# Patient Record
Sex: FEMALE | URBAN - METROPOLITAN AREA
[De-identification: names, ages, dates, MRNs, and addresses within clinical notes are randomized per-mention and may not be internally consistent; named-entity substitution may affect disease eponyms.]

---

## 2017-05-23 ENCOUNTER — IMPORTED ENCOUNTER (OUTPATIENT)
Dept: URBAN - METROPOLITAN AREA CLINIC 1 | Facility: CLINIC | Age: 62
End: 2017-05-23

## 2017-05-23 PROBLEM — H00.015: Noted: 2017-05-23

## 2017-05-23 PROBLEM — H01.005: Noted: 2017-05-23

## 2017-05-23 PROBLEM — H00.012: Noted: 2017-05-23

## 2017-05-23 PROBLEM — H25.13: Noted: 2017-05-23

## 2017-05-23 PROBLEM — H04.123: Noted: 2017-05-23

## 2017-05-23 PROBLEM — H01.002: Noted: 2017-05-23

## 2017-05-23 PROBLEM — H16.143: Noted: 2017-05-23

## 2017-05-23 PROCEDURE — 92012 INTRM OPH EXAM EST PATIENT: CPT

## 2017-05-23 NOTE — PATIENT DISCUSSION
1.  Hordeolum OU (OS x 2): Increase hot compresses to 5 minutes at a time TID. Start Maxitrol MARGUERITE to lids QHS. OS hordeolum expressed at SLE w/ cotton tipped applicator. Condition and treatment discussed w/ pt. Advised pt to call if not improved or resolved within 3 weeks. 2.  Blepharitis posterior type OU- The start of daily warm compresses and lid scrubs were recommended. 3. LENIN w/ PEK OU- Non compliant w/ tears. The use of artificial tears OU BID were recommended. 4.  Cataract OU: Observe for now without intervention. The patient was advised to contact us if any change or worsening of vision5. H/o Prisms in [employer]. Return for an appointment for a 30 in October with Dr. David Ventura.

## 2022-04-08 ASSESSMENT — TONOMETRY
OS_IOP_MMHG: 14
OD_IOP_MMHG: 14

## 2022-04-08 ASSESSMENT — VISUAL ACUITY
OS_SC: 20/20
OD_SC: 20/20